# Patient Record
Sex: MALE | Race: OTHER | Employment: FULL TIME | URBAN - NONMETROPOLITAN AREA
[De-identification: names, ages, dates, MRNs, and addresses within clinical notes are randomized per-mention and may not be internally consistent; named-entity substitution may affect disease eponyms.]

---

## 2023-01-26 ENCOUNTER — HOSPITAL ENCOUNTER (EMERGENCY)
Age: 38
Discharge: HOME | End: 2023-01-26
Payer: COMMERCIAL

## 2023-01-26 VITALS
HEART RATE: 53 BPM | HEIGHT: 74 IN | DIASTOLIC BLOOD PRESSURE: 3 MMHG | WEIGHT: 250 LBS | RESPIRATION RATE: 14 BRPM | SYSTOLIC BLOOD PRESSURE: 159 MMHG | OXYGEN SATURATION: 97 % | BODY MASS INDEX: 32.08 KG/M2 | TEMPERATURE: 98.1 F

## 2023-01-26 DIAGNOSIS — K04.7 DENTAL INFECTION: Primary | ICD-10-CM

## 2023-01-26 PROCEDURE — 99202 OFFICE O/P NEW SF 15 MIN: CPT | Performed by: NURSE PRACTITIONER

## 2023-01-26 PROCEDURE — 99203 OFFICE O/P NEW LOW 30 MIN: CPT

## 2023-01-26 RX ORDER — AMOXICILLIN AND CLAVULANATE POTASSIUM 875; 125 MG/1; MG/1
1 TABLET, FILM COATED ORAL 2 TIMES DAILY WITH MEALS
Qty: 20 TABLET | Refills: 0 | Status: SHIPPED | OUTPATIENT
Start: 2023-01-26 | End: 2023-02-05

## 2023-01-26 RX ORDER — ACETAMINOPHEN 500 MG
1000 TABLET ORAL EVERY 6 HOURS PRN
COMMUNITY

## 2023-01-26 ASSESSMENT — PAIN - FUNCTIONAL ASSESSMENT: PAIN_FUNCTIONAL_ASSESSMENT: 0-10

## 2023-01-26 ASSESSMENT — ENCOUNTER SYMPTOMS
COUGH: 0
FACIAL SWELLING: 0

## 2023-01-26 ASSESSMENT — PAIN SCALES - GENERAL: PAINLEVEL_OUTOF10: 7

## 2023-01-26 NOTE — ED PROVIDER NOTES
[address]       Chief Complaint   Patient presents with    Dental Pain     Left upper       Nurses Notes reviewed and I agree except as noted in the HPI. HISTORY OF PRESENT ILLNESS   Georges Argueta is a 34 y.o. male who presents to the urgent care for evaluation of dental pain that started 1/12/23. Left upper side, swollen and tender when touched. He has been taking tylenol. The patient/patient representative has no other acute complaints at this time. REVIEW OF SYSTEMS     Review of Systems   Constitutional:  Negative for chills and fever. HENT:  Positive for dental problem. Negative for facial swelling. Respiratory:  Negative for cough. Cardiovascular:  Negative for chest pain. Allergic/Immunologic: Negative for environmental allergies and food allergies. PAST MEDICAL HISTORY         Diagnosis Date    Heart abnormality        SURGICAL HISTORY     Patient  has no past surgical history on file. CURRENT MEDICATIONS       Previous Medications    ACETAMINOPHEN (TYLENOL) 500 MG TABLET    Take 1,000 mg by mouth every 6 hours as needed for Pain       ALLERGIES     Patient is is allergic to ibuprofen. FAMILY HISTORY     Patient'sfamily history includes Cancer in his father; Diabetes in his mother; High Blood Pressure in his mother. SOCIAL HISTORY     Patient  reports that he has quit smoking. His smoking use included cigarettes. He does not have any smokeless tobacco history on file. He reports that he does not currently use alcohol. He reports that he does not currently use drugs. PHYSICAL EXAM     ED TRIAGE VITALS  BP: (!) 159/3, Temp: 98.1 °F (36.7 °C), Heart Rate: 53, Resp: 14, SpO2: 97 %  Physical Exam  Vitals and nursing note reviewed. Constitutional:       General: He is not in acute distress. Appearance: Normal appearance. He is well-developed and well-groomed.    HENT:      Head: Normocephalic and atraumatic. Right Ear: External ear normal.      Left Ear: External ear normal.      Mouth/Throat:      Lips: Pink. Mouth: Mucous membranes are moist.      Dentition: Abnormal dentition. Dental tenderness and gingival swelling present. Eyes:      Conjunctiva/sclera:      Right eye: Right conjunctiva is not injected. Left eye: Left conjunctiva is not injected. Pupils: Pupils are equal.   Cardiovascular:      Rate and Rhythm: Normal rate. Pulmonary:      Effort: Pulmonary effort is normal. No respiratory distress. Musculoskeletal:      Cervical back: Normal range of motion. Skin:     General: Skin is warm and dry. Findings: No rash (on exposed surfaces). Neurological:      Mental Status: He is alert and oriented to person, place, and time. Gait: Gait is intact. Psychiatric:         Mood and Affect: Mood normal.         Speech: Speech normal.         Behavior: Behavior is cooperative. DIAGNOSTIC RESULTS   Labs:  Abnormal Labs Reviewed - No data to display     IMAGING:  No orders to display     URGENT CARE COURSE:     Vitals:    01/26/23 1555   BP: (!) 159/3   Pulse: 53   Resp: 14   Temp: 98.1 °F (36.7 °C)   SpO2: 97%   Weight: 250 lb (113.4 kg)   Height: 6' 2\" (1.88 m)       Medications - No data to display  PROCEDURES:  FINALIMPRESSION      1. Dental infection        DISPOSITION/PLAN   DISPOSITION Decision To Discharge 01/26/2023 04:15:16 PM             Problem List Items Addressed This Visit    None  Visit Diagnoses       Dental infection    -  Primary    Relevant Medications    amoxicillin-clavulanate (AUGMENTIN) 875-125 MG per tablet            Physical assessment findings, diagnostic testing(s) if applicable, and vital signs reviewed with patient/patient representative. Differential diagnosis(s) discussed with patient/patient representative. Prescription medications and/or over-the-counter medications for symptom management discussed.   Patient is to follow-up with family care provider in 2-3 days if no improvement. If symptoms should worsen or change, go to local emergency room for evaluation. Patient/patient representative is aware of care plan, questions answered, verbalizes understanding and is in agreement. Printed instructions attached to after visit summary. PATIENT REFERRED TO:  [address] [facility] [address] [address] [facility] [address] [place] [address]  Schedule an appointment as soon as possible for a visit in 3 days  if you do not have a family provider, If symptoms change/worsen, go to the ER      Follow with denstist as soon as possible. MEHNAZ Huitron - CNP    Please note that some or all of this chart was generated using [address] [place] [address] [employer] voice recognition software. Although every effort was made to ensure the accuracy of this automated transcription, some errors in transcription may have occurred.         MEHNAZ Huitron CNP  01/26/23 0700

## 2023-02-01 ENCOUNTER — HOSPITAL ENCOUNTER (EMERGENCY)
Age: 38
Discharge: HOME | End: 2023-02-01
Payer: COMMERCIAL

## 2023-02-01 VITALS
TEMPERATURE: 98.1 F | BODY MASS INDEX: 32.08 KG/M2 | WEIGHT: 250 LBS | DIASTOLIC BLOOD PRESSURE: 75 MMHG | HEIGHT: 74 IN | OXYGEN SATURATION: 97 % | HEART RATE: 103 BPM | SYSTOLIC BLOOD PRESSURE: 149 MMHG | RESPIRATION RATE: 18 BRPM

## 2023-02-01 DIAGNOSIS — K52.9 GASTROENTERITIS: Primary | ICD-10-CM

## 2023-02-01 DIAGNOSIS — R19.7 NAUSEA VOMITING AND DIARRHEA: ICD-10-CM

## 2023-02-01 DIAGNOSIS — R11.2 NAUSEA VOMITING AND DIARRHEA: ICD-10-CM

## 2023-02-01 PROCEDURE — 99213 OFFICE O/P EST LOW 20 MIN: CPT

## 2023-02-01 RX ORDER — ONDANSETRON 4 MG/1
4 TABLET, FILM COATED ORAL EVERY 8 HOURS PRN
Qty: 12 TABLET | Refills: 0 | Status: SHIPPED | OUTPATIENT
Start: 2023-02-01

## 2023-02-01 RX ORDER — LOPERAMIDE HYDROCHLORIDE 2 MG/1
2 CAPSULE ORAL 4 TIMES DAILY PRN
Qty: 8 CAPSULE | Refills: 0 | Status: SHIPPED | OUTPATIENT
Start: 2023-02-01 | End: 2023-02-03

## 2023-02-01 ASSESSMENT — ENCOUNTER SYMPTOMS
NAUSEA: 1
SHORTNESS OF BREATH: 0
ALLERGIC/IMMUNOLOGIC NEGATIVE: 1
DIARRHEA: 1
ABDOMINAL PAIN: 0
EYES NEGATIVE: 1
VOMITING: 1
SORE THROAT: 0

## 2023-02-01 ASSESSMENT — PAIN DESCRIPTION - DESCRIPTORS: DESCRIPTORS: ACHING;CRAMPING

## 2023-02-01 ASSESSMENT — PAIN SCALES - GENERAL: PAINLEVEL_OUTOF10: 7

## 2023-02-01 ASSESSMENT — PAIN - FUNCTIONAL ASSESSMENT: PAIN_FUNCTIONAL_ASSESSMENT: 0-10

## 2023-02-01 ASSESSMENT — PAIN DESCRIPTION - FREQUENCY: FREQUENCY: CONTINUOUS

## 2023-02-01 ASSESSMENT — PAIN DESCRIPTION - LOCATION: LOCATION: ABDOMEN

## 2023-02-01 ASSESSMENT — PAIN DESCRIPTION - PAIN TYPE: TYPE: ACUTE PAIN

## 2023-02-01 ASSESSMENT — PAIN DESCRIPTION - ONSET: ONSET: GRADUAL

## 2023-02-01 NOTE — ED TRIAGE NOTES
Arrives to [facility] for the evaluation of abdominal pain/cramping, emesis and diarrhea. Symptoms started yesterday and unsure if was some food that he at causing symptoms. Had vomited once this morning and reports 5-6 diarrhea stools since yesterday. Denies blood in stool. Rates abdominal pain 7/10 in severity. Afebrile. Has been drinking water. Waiting provider to assess for orders.

## 2023-02-01 NOTE — Clinical Note
Ron Sagastume was seen and treated in our emergency department on 2/1/2023. He may return to work on 02/02/2023. If you have any questions or concerns, please don't hesitate to call.       Bucky Galindo, APRN - CNP

## 2023-02-01 NOTE — DISCHARGE INSTRUCTIONS
Clear liquid diet for the net 12 hours. Slowly advance diet to bland foods as tolerated. Follow up with primary care provider as needed. Report to ER with new or severe symptoms.

## 2023-02-01 NOTE — ED PROVIDER NOTES
[address]els[address] Sales       Chief Complaint   Patient presents with    Abdominal Pain    Emesis    Diarrhea       Nurses Notes reviewed and I agree except as noted in the HPI. HISTORY OF PRESENT ILLNESS   Kelsi Gordon is a 34 y.o. male who presents to urgent care c/o nausea, vomiting, diarrhea. He denies fever. He denies abdominal pain. He states that he ate a [employer] bowl last evening. He woke up approximately midnight with vomiting and diarrhea. He states that the vomiting has improved. However he does still have some nausea and diarrhea. He is requesting a work note for today. REVIEW OF SYSTEMS     Review of Systems   Constitutional:  Negative for activity change, chills, fatigue and fever. HENT:  Negative for congestion and sore throat. Eyes: Negative. Respiratory:  Negative for shortness of breath. Cardiovascular:  Negative for chest pain. Gastrointestinal:  Positive for diarrhea, nausea and vomiting. Negative for abdominal pain. Endocrine: Negative. Genitourinary:  Negative for dysuria. Musculoskeletal:  Negative for myalgias. Skin:  Negative for rash. Allergic/Immunologic: Negative. Neurological: Negative. Hematological: Negative. Psychiatric/Behavioral: Negative. PAST MEDICAL HISTORY         Diagnosis Date    Heart abnormality        SURGICAL HISTORY     Patient  has no past surgical history on file. CURRENT MEDICATIONS       Discharge Medication List as of 2/1/2023 12:33 PM        CONTINUE these medications which have NOT CHANGED    Details   acetaminophen (TYLENOL) 500 MG tablet Take 1,000 mg by mouth every 6 hours as needed for PainHistorical Med             ALLERGIES     Patient is is allergic to ibuprofen. FAMILY HISTORY     Patient'sfamily history includes Cancer in his father; Diabetes in his mother; High Blood Pressure in his mother.     SOCIAL HISTORY     Patient  reports that he has quit smoking. His smoking use included cigarettes. He has never used smokeless tobacco. He reports that he does not currently use alcohol. He reports that he does not currently use drugs. PHYSICAL EXAM     ED TRIAGE VITALS  BP: (!) 149/75, Temp: 98.1 °F (36.7 °C), Heart Rate: (!) 103, Resp: 18, SpO2: 97 %  Physical Exam  Constitutional:       General: He is not in acute distress. Appearance: He is well-developed. He is not ill-appearing or toxic-appearing. HENT:      Head: Normocephalic and atraumatic. Right Ear: Tympanic membrane normal.      Left Ear: Tympanic membrane normal.      Nose: No congestion or rhinorrhea. Mouth/Throat:      Mouth: Mucous membranes are moist.      Pharynx: Oropharynx is clear. No pharyngeal swelling, posterior oropharyngeal erythema or uvula swelling. Tonsils: 0 on the right. 0 on the left. Eyes:      Conjunctiva/sclera: Conjunctivae normal.      Pupils: Pupils are equal, round, and reactive to light. Cardiovascular:      Rate and Rhythm: Normal rate and regular rhythm. Heart sounds: No murmur heard. Pulmonary:      Effort: Pulmonary effort is normal.      Breath sounds: Normal breath sounds. No wheezing. Abdominal:      General: Bowel sounds are normal.      Palpations: Abdomen is soft. Tenderness: There is no abdominal tenderness. Musculoskeletal:      Cervical back: Normal range of motion and neck supple. Lymphadenopathy:      Cervical: No cervical adenopathy. Skin:     General: Skin is warm and dry. Capillary Refill: Capillary refill takes less than 2 seconds. Neurological:      General: No focal deficit present. Mental Status: He is alert and oriented to person, place, and time. Psychiatric:         Mood and Affect: Mood normal.         Behavior: Behavior normal.       DIAGNOSTIC RESULTS   Labs:No results found for this visit on 02/01/23.     IMAGING:  No orders to display     URGENT CARE COURSE:         Medications - No data to display  PROCEDURES:  FINALIMPRESSION      1. Gastroenteritis    2. Nausea vomiting and diarrhea        DISPOSITION/PLAN   DISPOSITION Decision To Discharge 02/01/2023 12:30:52 PM  Patient has a soft nontender abdomen. He is non-ill-appearing. Recommended clear liquid diet for the next 12 hours. Slowly advance as tolerated. We will send in a prescription for Imodium and ondansetron. Will provide work note for today. He was advised to report to the ER with any new or severe symptoms. He denies any questions. PATIENT REFERRED TO:  No follow-up provider specified.   DISCHARGE MEDICATIONS:  Discharge Medication List as of 2/1/2023 12:33 PM        START taking these medications    Details   ondansetron (ZOFRAN) 4 MG tablet Take 1 tablet by mouth every 8 hours as needed for Nausea, Disp-12 tablet, R-0Normal      loperamide (RA ANTI-DIARRHEAL) 2 MG capsule Take 1 capsule by mouth 4 times daily as needed for Diarrhea, Disp-8 capsule, R-0Normal           Discharge Medication List as of 2/1/2023 12:33 PM          MEHNAZ James - MEHNAZ Elias - CNP  02/01/23 1531

## 2023-05-01 ENCOUNTER — HOSPITAL ENCOUNTER (EMERGENCY)
Age: 38
Discharge: HOME | End: 2023-05-01
Payer: COMMERCIAL

## 2023-05-01 VITALS
DIASTOLIC BLOOD PRESSURE: 88 MMHG | HEIGHT: 74 IN | TEMPERATURE: 98.5 F | RESPIRATION RATE: 16 BRPM | WEIGHT: 242 LBS | SYSTOLIC BLOOD PRESSURE: 127 MMHG | OXYGEN SATURATION: 97 % | HEART RATE: 55 BPM | BODY MASS INDEX: 31.06 KG/M2

## 2023-05-01 DIAGNOSIS — K02.9 PAIN DUE TO DENTAL CARIES: Primary | ICD-10-CM

## 2023-05-01 PROCEDURE — 99213 OFFICE O/P EST LOW 20 MIN: CPT | Performed by: NURSE PRACTITIONER

## 2023-05-01 PROCEDURE — 99213 OFFICE O/P EST LOW 20 MIN: CPT

## 2023-05-01 RX ORDER — AMOXICILLIN AND CLAVULANATE POTASSIUM 875; 125 MG/1; MG/1
1 TABLET, FILM COATED ORAL 2 TIMES DAILY
Qty: 20 TABLET | Refills: 0 | Status: SHIPPED | OUTPATIENT
Start: 2023-05-01 | End: 2023-05-11

## 2023-05-01 ASSESSMENT — PAIN DESCRIPTION - LOCATION: LOCATION: TEETH

## 2023-05-01 ASSESSMENT — PAIN SCALES - GENERAL: PAINLEVEL_OUTOF10: 4

## 2023-05-01 ASSESSMENT — ENCOUNTER SYMPTOMS
EYE REDNESS: 0
NAUSEA: 0
COUGH: 0
EYE DISCHARGE: 0
SHORTNESS OF BREATH: 0
VOMITING: 0
SORE THROAT: 0
TROUBLE SWALLOWING: 0
DIARRHEA: 0
RHINORRHEA: 0

## 2023-05-01 ASSESSMENT — PAIN DESCRIPTION - FREQUENCY: FREQUENCY: CONTINUOUS

## 2023-05-01 ASSESSMENT — PAIN DESCRIPTION - ONSET: ONSET: ON-GOING

## 2023-05-01 ASSESSMENT — PAIN DESCRIPTION - PAIN TYPE: TYPE: ACUTE PAIN

## 2023-05-01 ASSESSMENT — PAIN - FUNCTIONAL ASSESSMENT: PAIN_FUNCTIONAL_ASSESSMENT: 0-10

## 2023-05-01 ASSESSMENT — PAIN DESCRIPTION - ORIENTATION: ORIENTATION: LEFT;UPPER

## 2023-05-01 ASSESSMENT — PAIN DESCRIPTION - DESCRIPTORS: DESCRIPTORS: DISCOMFORT

## 2023-05-01 NOTE — ED PROVIDER NOTES
[place] [address]  Urgent Care Encounter      CHIEF COMPLAINT       Chief Complaint   Patient presents with    Dental Pain     Left upper dental pain       Nurses Notes reviewed and I agree except as noted in the HPI. HISTORY OF PRESENT ILLNESS   Malini Doshi is a 34 y.o. male who presents for 1 day history of dental pain to the left upper jaw. Patient states it kept him awake last night due to pain but pain is only presently 4/10. He is unable to take ibuprofen due to an unknown reaction when he was a child. Patient is using salt water gargles and Anbesol with minimal relief. He does not have a present dentist.  Denies fever, headache, nausea, vomiting, diarrhea, chest pain, shortness of breath, or other unusual symptoms at this time. REVIEW OF SYSTEMS     Review of Systems   Constitutional:  Negative for chills, diaphoresis, fatigue and fever. HENT:  Positive for dental problem. Negative for congestion, ear pain, rhinorrhea, sore throat and trouble swallowing. Eyes:  Negative for discharge and redness. Respiratory:  Negative for cough and shortness of breath. Cardiovascular:  Negative for chest pain. Gastrointestinal:  Negative for diarrhea, nausea and vomiting. Genitourinary:  Negative for decreased urine volume. Musculoskeletal:  Negative for neck pain and neck stiffness. Skin:  Negative for rash. Neurological:  Negative for headaches. Hematological:  Negative for adenopathy. Psychiatric/Behavioral:  Negative for sleep disturbance. PAST MEDICAL HISTORY         Diagnosis Date    Heart abnormality        SURGICAL HISTORY     Patient  has no past surgical history on file.     CURRENT MEDICATIONS       Previous Medications    ACETAMINOPHEN (TYLENOL) 500 MG TABLET    Take 1,000 mg by mouth every 6 hours as needed for Pain    ONDANSETRON (ZOFRAN) 4 MG TABLET    Take 1 tablet by mouth every 8 hours as needed for Nausea       ALLERGIES     Patient is is

## 2023-05-01 NOTE — ED NOTES
Pt presents to [facility] for c/o left upper dental pain       Taryn Moore, PILLO  89/26/87 5938

## 2024-01-05 ENCOUNTER — HOSPITAL ENCOUNTER (EMERGENCY)
Age: 39
Discharge: HOME | End: 2024-01-05

## 2024-01-05 VITALS
HEART RATE: 135 BPM | TEMPERATURE: 99.9 F | OXYGEN SATURATION: 93 % | BODY MASS INDEX: 31.33 KG/M2 | DIASTOLIC BLOOD PRESSURE: 70 MMHG | WEIGHT: 244 LBS | SYSTOLIC BLOOD PRESSURE: 104 MMHG | RESPIRATION RATE: 18 BRPM

## 2024-01-05 DIAGNOSIS — J10.1 INFLUENZA A: Primary | ICD-10-CM

## 2024-01-05 LAB
FLUAV AG SPEC QL: POSITIVE
FLUBV AG SPEC QL: NEGATIVE
SARS-COV-2 RDRP RESP QL NAA+PROBE: NOT  DETECTED

## 2024-01-05 PROCEDURE — 99213 OFFICE O/P EST LOW 20 MIN: CPT

## 2024-01-05 PROCEDURE — 87804 INFLUENZA ASSAY W/OPTIC: CPT

## 2024-01-05 PROCEDURE — 99214 OFFICE O/P EST MOD 30 MIN: CPT

## 2024-01-05 PROCEDURE — 87635 SARS-COV-2 COVID-19 AMP PRB: CPT

## 2024-01-05 RX ORDER — FLUTICASONE PROPIONATE 50 MCG
2 SPRAY, SUSPENSION (ML) NASAL DAILY
Qty: 16 G | Refills: 0 | Status: SHIPPED | OUTPATIENT
Start: 2024-01-05

## 2024-01-05 RX ORDER — ACETAMINOPHEN 325 MG/1
650 TABLET ORAL EVERY 4 HOURS PRN
Qty: 160 TABLET | Refills: 0 | Status: SHIPPED | OUTPATIENT
Start: 2024-01-05

## 2024-01-05 RX ORDER — OSELTAMIVIR PHOSPHATE 75 MG/1
75 CAPSULE ORAL 2 TIMES DAILY
Qty: 10 CAPSULE | Refills: 0 | Status: SHIPPED | OUTPATIENT
Start: 2024-01-05 | End: 2024-01-10

## 2024-01-05 RX ORDER — CETIRIZINE HYDROCHLORIDE 10 MG/1
10 TABLET ORAL DAILY
Qty: 30 TABLET | Refills: 0 | Status: SHIPPED | OUTPATIENT
Start: 2024-01-05 | End: 2024-02-04

## 2024-01-05 ASSESSMENT — PAIN - FUNCTIONAL ASSESSMENT
PAIN_FUNCTIONAL_ASSESSMENT: 0-10
PAIN_FUNCTIONAL_ASSESSMENT: PREVENTS OR INTERFERES SOME ACTIVE ACTIVITIES AND ADLS

## 2024-01-05 ASSESSMENT — PAIN SCALES - GENERAL: PAINLEVEL_OUTOF10: 9

## 2024-01-05 ASSESSMENT — PAIN DESCRIPTION - LOCATION: LOCATION: GENERALIZED

## 2024-01-05 NOTE — ED TRIAGE NOTES
Vel arrives to room with complaint of  sinus congestion, headache, chills, diarrhea, no appetite  symptoms started yesterday

## 2024-01-05 NOTE — ED PROVIDER NOTES
[facility] URGENT CARE  Urgent Care Encounter       CHIEF COMPLAINT       Chief Complaint   Patient presents with    Nasal Congestion       Nurses Notes reviewed and I agree except as noted in the HPI.  HISTORY OF PRESENT ILLNESS   Vel Saleem is a 38 y.o. male who presents with complaints of congestion, fever, body aches and headache. Pt reports symptoms started yesterday.     The history is provided by the patient.       REVIEW OF SYSTEMS     Review of Systems   Constitutional:  Positive for fever.   HENT:  Positive for congestion.    Musculoskeletal:  Positive for myalgias.   Neurological:  Positive for headaches.   All other systems reviewed and are negative.      PAST MEDICAL HISTORY         Diagnosis Date    Heart abnormality        SURGICALHISTORY     Patient  has no past surgical history on file.    CURRENT MEDICATIONS       Discharge Medication List as of 1/5/2024  9:33 AM          ALLERGIES     Patient is is allergic to ibuprofen.    Patients   There is no immunization history on file for this patient.    FAMILY HISTORY     Patient's family history includes Cancer in his father; Diabetes in his mother; High Blood Pressure in his mother.    SOCIAL HISTORY     Patient  reports that he has been smoking cigarettes. He has never used smokeless tobacco. He reports that he does not currently use alcohol. He reports that he does not currently use drugs.    PHYSICAL EXAM     ED TRIAGE VITALS  BP: 104/70, Temp: 99.9 °F (37.7 °C), Pulse: (!) 135, Respirations: 18, SpO2: 93 %,Estimated body mass index is 31.33 kg/m² as calculated from the following:    Height as of 5/1/23: 1.88 m (6' 2\").    Weight as of this encounter: 110.7 kg (244 lb).,No LMP for male patient.    Physical Exam  Vitals and nursing note reviewed.   Constitutional:       Appearance: He is obese. He is ill-appearing.   HENT:      Right Ear: Tympanic membrane normal.      Left Ear: Tympanic membrane normal.      Nose: Congestion

## 2024-03-11 ENCOUNTER — HOSPITAL ENCOUNTER (EMERGENCY)
Age: 39
Discharge: HOME | End: 2024-03-11
Payer: COMMERCIAL

## 2024-03-11 VITALS
OXYGEN SATURATION: 96 % | HEART RATE: 66 BPM | RESPIRATION RATE: 14 BRPM | SYSTOLIC BLOOD PRESSURE: 137 MMHG | TEMPERATURE: 97.3 F | HEIGHT: 74 IN | BODY MASS INDEX: 31.44 KG/M2 | WEIGHT: 245 LBS | DIASTOLIC BLOOD PRESSURE: 74 MMHG

## 2024-03-11 DIAGNOSIS — J02.0 STREPTOCOCCAL SORE THROAT: Primary | ICD-10-CM

## 2024-03-11 LAB
FLUAV AG SPEC QL: NEGATIVE
FLUBV AG SPEC QL: NEGATIVE
S PYO AG THROAT QL: POSITIVE
SARS-COV-2 RDRP RESP QL NAA+PROBE: NOT  DETECTED

## 2024-03-11 PROCEDURE — 99213 OFFICE O/P EST LOW 20 MIN: CPT

## 2024-03-11 PROCEDURE — 87651 STREP A DNA AMP PROBE: CPT

## 2024-03-11 PROCEDURE — 87635 SARS-COV-2 COVID-19 AMP PRB: CPT

## 2024-03-11 PROCEDURE — 87804 INFLUENZA ASSAY W/OPTIC: CPT

## 2024-03-11 RX ORDER — OXYMETAZOLINE HYDROCHLORIDE 0.05 G/100ML
2 SPRAY NASAL 2 TIMES DAILY
Qty: 12 ML | Refills: 0 | Status: SHIPPED | OUTPATIENT
Start: 2024-03-11 | End: 2024-04-10

## 2024-03-11 RX ORDER — AMOXICILLIN 500 MG/1
500 CAPSULE ORAL 2 TIMES DAILY
Qty: 20 CAPSULE | Refills: 0 | Status: SHIPPED | OUTPATIENT
Start: 2024-03-11 | End: 2024-03-21

## 2024-03-11 ASSESSMENT — PAIN DESCRIPTION - LOCATION: LOCATION: THROAT

## 2024-03-11 ASSESSMENT — PAIN SCALES - GENERAL: PAINLEVEL_OUTOF10: 9

## 2024-03-11 ASSESSMENT — PAIN - FUNCTIONAL ASSESSMENT: PAIN_FUNCTIONAL_ASSESSMENT: 0-10

## 2024-03-11 NOTE — DISCHARGE INSTRUCTIONS
Take antibiotics until they are gone even if you are feeling better.  Okay to alternate Tylenol/Motrin every 3-4 hours for the next 1-2 days to prevent fever/body aches and any pain associated with strep throat.    If symptoms fail to improve over 1 week please see your family doctor or sooner if they worsen.    Please hydrate well keeping urine clear/pale yellow while you are on these medications/treatments.  Please abstain from attending work and minimize interactions with others until you are fever free without the use of medications for 24 hours and you have been on antibiotics for at least a day.    If you have loved ones similar symptoms they should be evaluated and tested for strep throat also.    Please change toothbrush/clean linens, you can reinfect yourself with strep throat.    I hope you are feeling better soon!

## 2024-03-11 NOTE — ED PROVIDER NOTES
route daily     ALLERGIES     Patient is is allergic to ibuprofen.  Patients   There is no immunization history on file for this patient.  FAMILY HISTORY     Patient's family history includes Cancer in his father; Diabetes in his mother; High Blood Pressure in his mother.  SOCIAL HISTORY     Patient  reports that he has been smoking cigarettes. He has never used smokeless tobacco. He reports that he does not currently use alcohol. He reports that he does not currently use drugs.  PHYSICAL EXAM     ED TRIAGE VITALS  BP: 137/74, Temp: 97.3 °F (36.3 °C), Pulse: 66, Respirations: 14, SpO2: 96 %,Estimated body mass index is 31.46 kg/m² as calculated from the following:    Height as of this encounter: 1.88 m (6' 2\").    Weight as of this encounter: 111.1 kg (245 lb).,No LMP for male patient.  Physical Exam  Vitals and nursing note reviewed.   Constitutional:       General: He is not in acute distress.     Appearance: He is well-developed. He is not ill-appearing, toxic-appearing or diaphoretic.   HENT:      Head: Normocephalic.      Right Ear: Tympanic membrane and ear canal normal. No drainage, swelling or tenderness. No middle ear effusion. Tympanic membrane is not erythematous.      Left Ear: Tympanic membrane and ear canal normal. No drainage, swelling or tenderness.  No middle ear effusion. Tympanic membrane is not erythematous.      Nose: Congestion and rhinorrhea present.      Comments: Clear rhinorrhea     Mouth/Throat:      Mouth: Mucous membranes are moist. No oral lesions.      Pharynx: Posterior oropharyngeal erythema and uvula swelling present. No pharyngeal swelling or oropharyngeal exudate.      Tonsils: Tonsillar exudate present. No tonsillar abscesses. 3+ on the right. 3+ on the left.   Eyes:      Conjunctiva/sclera: Conjunctivae normal.      Pupils: Pupils are equal, round, and reactive to light.   Cardiovascular:      Rate and Rhythm: Normal rate and regular rhythm.      Heart sounds: Normal heart  BENZOCAINE-MENTHOL (CEPACOL EXTRA STRENGTH) 15-2.6 MG LOZG LOZENGE    Take 1 lozenge by mouth every 2 hours as needed for Sore Throat    OXYMETAZOLINE (12 HOUR NASAL SPRAY) 0.05 % NASAL SPRAY    2 sprays by Nasal route 2 times daily     Discontinued Medications    No medications on file     Current Discharge Medication List        Hieu Card, MEHNAZ - CNP    (Please note that portions of this note were completed with a voice recognition program. Efforts were made to edit the dictations but occasionally words are mis-transcribed.)            Hieu Card, APRN - CNP  03/11/24 1000

## 2024-10-15 ENCOUNTER — OFFICE VISIT (OUTPATIENT)
Dept: FAMILY MEDICINE CLINIC | Age: 39
End: 2024-10-15
Payer: COMMERCIAL

## 2024-10-15 VITALS
BODY MASS INDEX: 29.58 KG/M2 | SYSTOLIC BLOOD PRESSURE: 128 MMHG | RESPIRATION RATE: 12 BRPM | WEIGHT: 230.4 LBS | DIASTOLIC BLOOD PRESSURE: 76 MMHG | HEART RATE: 60 BPM | TEMPERATURE: 97.6 F

## 2024-10-15 DIAGNOSIS — R06.2 WHEEZING: ICD-10-CM

## 2024-10-15 DIAGNOSIS — Z11.4 ENCOUNTER FOR SCREENING FOR HIV: ICD-10-CM

## 2024-10-15 DIAGNOSIS — Z13.220 LIPID SCREENING: ICD-10-CM

## 2024-10-15 DIAGNOSIS — Z11.59 NEED FOR HEPATITIS C SCREENING TEST: ICD-10-CM

## 2024-10-15 DIAGNOSIS — Z13.1 DIABETES MELLITUS SCREENING: ICD-10-CM

## 2024-10-15 DIAGNOSIS — L30.1 DYSHIDROTIC ECZEMA: ICD-10-CM

## 2024-10-15 DIAGNOSIS — J30.2 SEASONAL ALLERGIES: Primary | ICD-10-CM

## 2024-10-15 PROCEDURE — G8427 DOCREV CUR MEDS BY ELIG CLIN: HCPCS | Performed by: STUDENT IN AN ORGANIZED HEALTH CARE EDUCATION/TRAINING PROGRAM

## 2024-10-15 PROCEDURE — G8419 CALC BMI OUT NRM PARAM NOF/U: HCPCS | Performed by: STUDENT IN AN ORGANIZED HEALTH CARE EDUCATION/TRAINING PROGRAM

## 2024-10-15 PROCEDURE — G8484 FLU IMMUNIZE NO ADMIN: HCPCS | Performed by: STUDENT IN AN ORGANIZED HEALTH CARE EDUCATION/TRAINING PROGRAM

## 2024-10-15 PROCEDURE — 4004F PT TOBACCO SCREEN RCVD TLK: CPT | Performed by: STUDENT IN AN ORGANIZED HEALTH CARE EDUCATION/TRAINING PROGRAM

## 2024-10-15 PROCEDURE — 99214 OFFICE O/P EST MOD 30 MIN: CPT | Performed by: STUDENT IN AN ORGANIZED HEALTH CARE EDUCATION/TRAINING PROGRAM

## 2024-10-15 RX ORDER — FLUTICASONE PROPIONATE 50 MCG
1 SPRAY, SUSPENSION (ML) NASAL DAILY
Qty: 32 G | Refills: 1 | Status: SHIPPED | OUTPATIENT
Start: 2024-10-15

## 2024-10-15 RX ORDER — CLOBETASOL PROPIONATE 0.5 MG/G
OINTMENT TOPICAL
Qty: 30 G | Refills: 0 | Status: SHIPPED | OUTPATIENT
Start: 2024-10-15

## 2024-10-15 RX ORDER — LORATADINE 10 MG/1
10 TABLET ORAL DAILY
Qty: 90 TABLET | Refills: 3 | Status: SHIPPED | OUTPATIENT
Start: 2024-10-15

## 2024-10-15 SDOH — ECONOMIC STABILITY: FOOD INSECURITY: WITHIN THE PAST 12 MONTHS, THE FOOD YOU BOUGHT JUST DIDN'T LAST AND YOU DIDN'T HAVE MONEY TO GET MORE.: NEVER TRUE

## 2024-10-15 SDOH — ECONOMIC STABILITY: FOOD INSECURITY: WITHIN THE PAST 12 MONTHS, YOU WORRIED THAT YOUR FOOD WOULD RUN OUT BEFORE YOU GOT MONEY TO BUY MORE.: NEVER TRUE

## 2024-10-15 SDOH — ECONOMIC STABILITY: INCOME INSECURITY: HOW HARD IS IT FOR YOU TO PAY FOR THE VERY BASICS LIKE FOOD, HOUSING, MEDICAL CARE, AND HEATING?: NOT HARD AT ALL

## 2024-10-15 ASSESSMENT — ENCOUNTER SYMPTOMS
SORE THROAT: 0
RHINORRHEA: 0
SINUS PAIN: 0
EYE PAIN: 0
NAUSEA: 0
EYE REDNESS: 1
SHORTNESS OF BREATH: 0
COUGH: 1
DIARRHEA: 0
EYE DISCHARGE: 0
WHEEZING: 1
ABDOMINAL PAIN: 0
EYE ITCHING: 1
CONSTIPATION: 0
SINUS PRESSURE: 0
VOMITING: 0

## 2024-10-15 ASSESSMENT — PATIENT HEALTH QUESTIONNAIRE - PHQ9
SUM OF ALL RESPONSES TO PHQ QUESTIONS 1-9: 0
SUM OF ALL RESPONSES TO PHQ QUESTIONS 1-9: 0
SUM OF ALL RESPONSES TO PHQ9 QUESTIONS 1 & 2: 0
2. FEELING DOWN, DEPRESSED OR HOPELESS: NOT AT ALL
1. LITTLE INTEREST OR PLEASURE IN DOING THINGS: NOT AT ALL
SUM OF ALL RESPONSES TO PHQ QUESTIONS 1-9: 0
SUM OF ALL RESPONSES TO PHQ QUESTIONS 1-9: 0

## 2024-10-15 NOTE — PROGRESS NOTES
Vel Saleem is a 39 y.o. male who presents today for:  Chief Complaint   Patient presents with    New Patient     Get established. Had a physical at work and the nurse wanted him to get check out by a doctor          HPI:   Vel Saleem is 39 y.o. who presents today to establish care.  Patient presents with his wife today.    He is employed through [employer], and was undergoing employment physical where he had lung function test completed and was told he needed to follow-up with a physician.  On review of spirometry, FEV1 54% of predicted, FEV/FVC 88% of predicted.  No bronchodilation was performed.  Patient states he believes he had a history of asthma in the past.  He is not on medications currently.  He denies shortness of breath.  States he will feel an occasional wheezing sensation.  States he does not get short of breath or have worsening wheezes when he is exercising or performing work duties.  He is a current smoker, 8 cigarettes daily.  He has never been diagnosed with COPD in the past.  States his breathing has been worse recently due to seasonal allergies.  He is coughing up significant amount of phlegm daily, and this will make him wheeze at times.  He also has eye itching, postnasal drainage.  No fever, chills.  He is not using anything for allergies.    Patient also endorses rash to his bilateral palms of hands.  This has been present for several months.  It is worse after working.  He uses cortisone cream, which is intermittently helpful.    Patient is otherwise healthy, not taking any medications regularly.      Objective:     Vitals:    10/15/24 0813   BP: 128/76   Pulse: 60   Resp: 12   Temp: 97.6 °F (36.4 °C)   TempSrc: Oral   Weight: 104.5 kg (230 lb 6.4 oz)       Wt Readings from Last 3 Encounters:   10/15/24 104.5 kg (230 lb 6.4 oz)   03/11/24 111.1 kg (245 lb)   01/05/24 110.7 kg (244 lb)       BP Readings from Last 3 Encounters:   10/15/24 128/76   03/11/24 137/74